# Patient Record
Sex: FEMALE | Race: WHITE | NOT HISPANIC OR LATINO | Employment: FULL TIME | URBAN - METROPOLITAN AREA
[De-identification: names, ages, dates, MRNs, and addresses within clinical notes are randomized per-mention and may not be internally consistent; named-entity substitution may affect disease eponyms.]

---

## 2018-01-01 ENCOUNTER — TELEPHONE (OUTPATIENT)
Dept: INFECTIOUS DISEASES | Facility: CLINIC | Age: 43
End: 2018-01-01

## 2018-01-01 ENCOUNTER — OFFICE VISIT (OUTPATIENT)
Dept: INFECTIOUS DISEASES | Facility: CLINIC | Age: 43
End: 2018-01-01
Payer: MEDICAID

## 2018-01-01 ENCOUNTER — LAB VISIT (OUTPATIENT)
Dept: LAB | Facility: HOSPITAL | Age: 43
End: 2018-01-01
Attending: INTERNAL MEDICINE
Payer: MEDICAID

## 2018-01-01 ENCOUNTER — HOSPITAL ENCOUNTER (EMERGENCY)
Facility: HOSPITAL | Age: 43
End: 2018-07-05
Attending: EMERGENCY MEDICINE
Payer: MEDICAID

## 2018-01-01 VITALS — BODY MASS INDEX: 29.03 KG/M2 | HEIGHT: 67 IN | WEIGHT: 185 LBS

## 2018-01-01 VITALS — BODY MASS INDEX: 29.1 KG/M2 | WEIGHT: 185.44 LBS | HEIGHT: 67 IN

## 2018-01-01 VITALS — DIASTOLIC BLOOD PRESSURE: 77 MMHG | SYSTOLIC BLOOD PRESSURE: 129 MMHG

## 2018-01-01 DIAGNOSIS — R76.8 HEPATITIS B CORE ANTIBODY POSITIVE: ICD-10-CM

## 2018-01-01 DIAGNOSIS — Z15.89: ICD-10-CM

## 2018-01-01 DIAGNOSIS — B20 HUMAN IMMUNODEFICIENCY VIRUS (HIV) DISEASE: ICD-10-CM

## 2018-01-01 DIAGNOSIS — B20 HUMAN IMMUNODEFICIENCY VIRUS (HIV) DISEASE: Primary | ICD-10-CM

## 2018-01-01 DIAGNOSIS — I46.8 TRAUMATIC CARDIAC ARREST: Primary | ICD-10-CM

## 2018-01-01 DIAGNOSIS — F31.9 BIPOLAR AFFECTIVE DISORDER, REMISSION STATUS UNSPECIFIED: ICD-10-CM

## 2018-01-01 LAB
ALBUMIN SERPL BCP-MCNC: 3.8 G/DL
ALP SERPL-CCNC: 62 U/L
ALT SERPL W/O P-5'-P-CCNC: 16 U/L
ANION GAP SERPL CALC-SCNC: 7 MMOL/L
AST SERPL-CCNC: 17 U/L
BASOPHILS # BLD AUTO: 0.03 K/UL
BASOPHILS NFR BLD: 0.4 %
BILIRUB SERPL-MCNC: 1.1 MG/DL
BUN SERPL-MCNC: 10 MG/DL
CALCIUM SERPL-MCNC: 9.2 MG/DL
CD3+CD4+ CELLS # BLD: 614 CELLS/UL (ref 300–1400)
CD3+CD4+ CELLS NFR BLD: 38.2 % (ref 28–57)
CHLORIDE SERPL-SCNC: 106 MMOL/L
CHOLEST SERPL-MCNC: 127 MG/DL
CHOLEST/HDLC SERPL: 2.2 {RATIO}
CMV IGG SERPL QL IA: NORMAL
CO2 SERPL-SCNC: 28 MMOL/L
CREAT SERPL-MCNC: 0.8 MG/DL
DIFFERENTIAL METHOD: ABNORMAL
EOSINOPHIL # BLD AUTO: 0.6 K/UL
EOSINOPHIL NFR BLD: 8.5 %
ERYTHROCYTE [DISTWIDTH] IN BLOOD BY AUTOMATED COUNT: 13.4 %
EST. GFR  (AFRICAN AMERICAN): >60 ML/MIN/1.73 M^2
EST. GFR  (NON AFRICAN AMERICAN): >60 ML/MIN/1.73 M^2
GLUCOSE SERPL-MCNC: 91 MG/DL
GLUCOSE-6-PHOSPHATE DEHYDROGENASE QUAL: NORMAL
HBV CORE AB SERPL QL IA: POSITIVE
HBV SURFACE AB SER-ACNC: POSITIVE M[IU]/ML
HBV SURFACE AG SERPL QL IA: NEGATIVE
HCT VFR BLD AUTO: 39.8 %
HCV AB SERPL QL IA: NEGATIVE
HDLC SERPL-MCNC: 57 MG/DL
HDLC SERPL: 44.9 %
HGB BLD-MCNC: 13 G/DL
IMM GRANULOCYTES # BLD AUTO: 0.02 K/UL
IMM GRANULOCYTES NFR BLD AUTO: 0.3 %
LDLC SERPL CALC-MCNC: 59 MG/DL
LYMPHOCYTES # BLD AUTO: 1.6 K/UL
LYMPHOCYTES NFR BLD: 21.2 %
MCH RBC QN AUTO: 32.7 PG
MCHC RBC AUTO-ENTMCNC: 32.7 G/DL
MCV RBC AUTO: 100 FL
MITOGEN NIL: >10 IU/ML
MONOCYTES # BLD AUTO: 0.7 K/UL
MONOCYTES NFR BLD: 9.7 %
NEUTROPHILS # BLD AUTO: 4.4 K/UL
NEUTROPHILS NFR BLD: 59.9 %
NIL: 0.03 IU/ML
NONHDLC SERPL-MCNC: 70 MG/DL
NRBC BLD-RTO: 0 /100 WBC
PLATELET # BLD AUTO: 245 K/UL
PMV BLD AUTO: 11.7 FL
POTASSIUM SERPL-SCNC: 3.7 MMOL/L
PROT SERPL-MCNC: 7 G/DL
RBC # BLD AUTO: 3.98 M/UL
RPR SER QL: NORMAL
SODIUM SERPL-SCNC: 141 MMOL/L
T GONDII IGG SER QL IA: NORMAL
T GONDII IGG SERPL IA-ACNC: <5 IU/ML
T PALLIDUM AB SER QL IF: NORMAL
TB ANTIGEN NIL: 0.01 IU/ML
TB ANTIGEN: 0.04 IU/ML
TB GOLD: NEGATIVE
TRIGL SERPL-MCNC: 55 MG/DL
WBC # BLD AUTO: 7.41 K/UL

## 2018-01-01 PROCEDURE — 86592 SYPHILIS TEST NON-TREP QUAL: CPT

## 2018-01-01 PROCEDURE — 99202 OFFICE O/P NEW SF 15 MIN: CPT | Mod: PBBFAC,PO | Performed by: INTERNAL MEDICINE

## 2018-01-01 PROCEDURE — 99212 OFFICE O/P EST SF 10 MIN: CPT | Mod: PBBFAC,PO | Performed by: INTERNAL MEDICINE

## 2018-01-01 PROCEDURE — 31500 INSERT EMERGENCY AIRWAY: CPT

## 2018-01-01 PROCEDURE — 82960 TEST FOR G6PD ENZYME: CPT

## 2018-01-01 PROCEDURE — 86803 HEPATITIS C AB TEST: CPT

## 2018-01-01 PROCEDURE — 86777 TOXOPLASMA ANTIBODY: CPT

## 2018-01-01 PROCEDURE — 99204 OFFICE O/P NEW MOD 45 MIN: CPT | Mod: S$PBB,,, | Performed by: INTERNAL MEDICINE

## 2018-01-01 PROCEDURE — 99999 PR PBB SHADOW E&M-NEW PATIENT-LVL II: CPT | Mod: PBBFAC,,, | Performed by: INTERNAL MEDICINE

## 2018-01-01 PROCEDURE — 86480 TB TEST CELL IMMUN MEASURE: CPT

## 2018-01-01 PROCEDURE — 96374 THER/PROPH/DIAG INJ IV PUSH: CPT

## 2018-01-01 PROCEDURE — 86704 HEP B CORE ANTIBODY TOTAL: CPT

## 2018-01-01 PROCEDURE — 87340 HEPATITIS B SURFACE AG IA: CPT

## 2018-01-01 PROCEDURE — 86644 CMV ANTIBODY: CPT

## 2018-01-01 PROCEDURE — 36415 COLL VENOUS BLD VENIPUNCTURE: CPT | Mod: PO

## 2018-01-01 PROCEDURE — 85025 COMPLETE CBC W/AUTO DIFF WBC: CPT

## 2018-01-01 PROCEDURE — 32554 ASPIRATE PLEURA W/O IMAGING: CPT

## 2018-01-01 PROCEDURE — 86780 TREPONEMA PALLIDUM: CPT

## 2018-01-01 PROCEDURE — 80061 LIPID PANEL: CPT

## 2018-01-01 PROCEDURE — 63600175 PHARM REV CODE 636 W HCPCS: Performed by: EMERGENCY MEDICINE

## 2018-01-01 PROCEDURE — 99999 PR PBB SHADOW E&M-EST. PATIENT-LVL II: CPT | Mod: PBBFAC,,, | Performed by: INTERNAL MEDICINE

## 2018-01-01 PROCEDURE — 86706 HEP B SURFACE ANTIBODY: CPT

## 2018-01-01 PROCEDURE — 99291 CRITICAL CARE FIRST HOUR: CPT | Mod: 25

## 2018-01-01 PROCEDURE — 99214 OFFICE O/P EST MOD 30 MIN: CPT | Mod: S$PBB,,, | Performed by: INTERNAL MEDICINE

## 2018-01-01 PROCEDURE — 86361 T CELL ABSOLUTE COUNT: CPT

## 2018-01-01 PROCEDURE — 80053 COMPREHEN METABOLIC PANEL: CPT

## 2018-01-01 RX ORDER — EPINEPHRINE 0.1 MG/ML
INJECTION INTRAVENOUS CODE/TRAUMA/SEDATION MEDICATION
Status: COMPLETED | OUTPATIENT
Start: 2018-01-01 | End: 2018-01-01

## 2018-01-01 RX ADMIN — EPINEPHRINE 1 MG: 0.1 INJECTION, SOLUTION ENDOTRACHEAL; INTRACARDIAC; INTRAVENOUS at 10:07

## 2018-05-22 NOTE — PROGRESS NOTES
Subjective:      Patient ID: Samantha Napier is a 43 y.o. female.    Chief Complaint:HIV Positive/AIDS      History of Present Illness    A 43-year-old woman with bipolar disorder, and HIV a diagnosed in 5 years ago during routing screening as she was a blood/plasma donor. She was followed at [facility] in [place] at the time of her diagnosis however she states she was told she had no indications for antiretroviral therapy. She does not know her viral load or her CD 4 counts. She has not been diagnosed with AIDS defining illness. She acquired HIV thru a heterosexual sex contact.She is seen in clinic today to establish HIV care.     Mrs. Napier recently moved back to [place] from [place] and [facility]. She has no known drug allergies. She smokes 2 cigarettes per day and has smoked since age 14. She consumes 2-3 beers per day every day. She does not currently use illicit drugs however has smoked marijuana in the past. She is sexually active with male and female partners. She estimates 20 sex partners in the past year and estimates over 200 sex partners in her lifetime. She uses condoms inconsistently. She does not fish, hunt or garden. She recently started working as a  at [employer].      Review of Systems   Constitution: Negative for chills, decreased appetite, fever, weakness, malaise/fatigue, night sweats, weight gain and weight loss.   HENT: Negative for congestion, ear pain, hearing loss, hoarse voice, sore throat and tinnitus.    Eyes: Negative for blurred vision, redness and visual disturbance.   Cardiovascular: Negative for chest pain, leg swelling and palpitations.   Respiratory: Negative for cough, hemoptysis, shortness of breath and sputum production.    Hematologic/Lymphatic: Negative for adenopathy. Does not bruise/bleed easily.   Skin: Negative for dry skin, itching, rash and suspicious lesions.   Musculoskeletal: Negative for back pain, joint pain, myalgias and neck pain.    Gastrointestinal: Negative for abdominal pain, constipation, diarrhea, heartburn, nausea and vomiting.   Genitourinary: Negative for dysuria, flank pain, frequency, hematuria, hesitancy and urgency.   Neurological: Negative for dizziness, headaches, numbness and paresthesias.   Psychiatric/Behavioral: Negative for depression and memory loss. The patient does not have insomnia and is not nervous/anxious.      Objective:   Physical Exam   Constitutional: She is oriented to person, place, and time. She appears well-developed and well-nourished. She is cooperative. She is easily aroused.  Non-toxic appearance. No distress.   HENT:   Head: Normocephalic and atraumatic. Head is without right periorbital erythema and without left periorbital erythema.   Right Ear: Hearing, tympanic membrane, external ear and ear canal normal. No swelling.   Left Ear: Hearing, tympanic membrane, external ear and ear canal normal. No swelling.   Nose: Nose normal. No nasal deformity.   Mouth/Throat: Uvula is midline, oropharynx is clear and moist and mucous membranes are normal. No oropharyngeal exudate.   Eyes: Conjunctivae, EOM and lids are normal. Pupils are equal, round, and reactive to light. Right eye exhibits no discharge and no exudate. Left eye exhibits no discharge and no exudate. Right conjunctiva is not injected. Left conjunctiva is not injected. No scleral icterus.   Neck: Normal range of motion. Neck supple. No tracheal deviation present. No thyromegaly present.   Cardiovascular: Normal rate, regular rhythm, S1 normal, S2 normal, normal heart sounds and intact distal pulses.  Exam reveals no gallop and no friction rub.    No murmur heard.  Pulmonary/Chest: Effort normal and breath sounds normal. No accessory muscle usage or stridor. No tachypnea. No respiratory distress. She has no wheezes. She has no rales. She exhibits no tenderness.   Abdominal: Soft. Normal appearance and bowel sounds are normal. She exhibits no  distension. There is no tenderness. There is no rebound and no guarding.   Musculoskeletal: Normal range of motion. She exhibits no edema or tenderness.   Neurological: She is alert, oriented to person, place, and time and easily aroused. No cranial nerve deficit. Coordination normal.   Skin: Skin is warm and dry. No lesion and no rash noted. She is not diaphoretic. No cyanosis or erythema. No pallor. Nails show no clubbing.   Tattoo bilateral ankles   Psychiatric: Judgment and thought content normal. Her mood appears anxious. Her speech is rapid and/or pressured. She is hyperactive.   Nursing note and vitals reviewed.    Assessment:       1. Human immunodeficiency virus (HIV) disease    2. Bipolar affective disorder, remission status unspecified          Plan:   HIV  No recent work up. Lost to follow up and has not established care in 4 years post her diagnosis.   · HIV VL, CD4, CBC, CMP, Quantiferon Gold Tb, lipid panel, RPR, FTA-ABS, CMV IgG, Toxoplasma IgG, HCV ab, HBV Ag and Ab, G6PD, and HLA-.   · Will not start empiric ART for treatment naive HIV until above results are available to vicente optimal time to start.  · Unclear if patient will be adherent to therapy at this time.   · I explained HIV disease, and management including but not limited to disease course, goals of treatment, and complications.   Bipolar disorder type 1  Patient appears to be manic.  · Management per primary care or psychiatry.  Follow up  · RTC on 6/11 at 1 pm to discuss above work up and management plan.

## 2018-05-22 NOTE — LETTER
May 22, 2018      Pamela Plaza, [facility]  [address] [place] [address]           [facility]  [address] [facility] [address]  [facility] [address]  Phone: [phone]  Fax: [phone]          Patient: Samantha Napier   MR Number: [id number]   YOB: 1975   Date of Visit: 5/22/2018       Dear Pamela Plaza:    Thank you for referring Samantha Napier to me for evaluation. Attached you will find relevant portions of my assessment and plan of care.    If you have questions, please do not hesitate to call me. I look forward to following Samantha Napier along with you.    Sincerely,    Roro Serrato MD    Enclosure  CC:  No Recipients    If you would like to receive this communication electronically, please contact [email or web address] or [phone] to request more information on [facility] access.    For providers and/or their staff who would like to refer a patient to [facility], please contact us through our one-stop-shop provider referral line, [facility], at [phone].    If you feel you have received this communication in error or would no longer like to receive these types of communications, please e-mail [email or web address]

## 2018-06-11 NOTE — PROGRESS NOTES
Subjective:      Patient ID: Samantha Napier is a 43 y.o. female.    Chief Complaint:Follow-up (HIV)      History of Present Illness  HPI     Follow-up    Additional comments: HIV       Last edited by Rabia Napier LPN on 6/11/2018  1:15 PM. (History)      A 43-year-old woman with bipolar disorder, and HIV who is seen today for continued management of her HIV disease. She feels well and has no complaints. She will be visiting her daughter out of state for several months.     Mrs. Napier was initially diagnosed diagnosed with HIV 5 years ago during routing screening as she was a blood/plasma donor. She was followed at [facility] in [place] at the time of her diagnosis however she states she was told she had no indications for antiretroviral therapy. She does not know her viral load or her CD 4 counts. She has not been diagnosed with AIDS defining illness. She acquired HIV thru a heterosexual sex contact.She is seen in clinic today to establish HIV care.      Mrs. Napier recently moved back to [place] from [place] and [facility]. She has no known drug allergies. She smokes 2 cigarettes per day and has smoked since age 14. She consumes 2-3 beers per day every day. She does not currently use illicit drugs however has smoked marijuana in the past. She is sexually active with male and female partners. She estimates 20 sex partners in the past year and estimates over 200 sex partners in her lifetime. She uses condoms inconsistently. She does not fish, hunt or garden. She recently started working as a  at [employer].      Review of Systems   Constitution: Negative for chills, decreased appetite, fever, weakness, malaise/fatigue, night sweats, weight gain and weight loss.   HENT: Negative for congestion, ear pain, hearing loss, hoarse voice, sore throat and tinnitus.    Eyes: Negative for blurred vision, redness and visual disturbance.   Cardiovascular: Negative for chest pain, leg swelling and  palpitations.   Respiratory: Negative for cough, hemoptysis, shortness of breath and sputum production.    Hematologic/Lymphatic: Negative for adenopathy. Does not bruise/bleed easily.   Skin: Negative for dry skin, itching, rash and suspicious lesions.   Musculoskeletal: Negative for back pain, joint pain, myalgias and neck pain.   Gastrointestinal: Negative for abdominal pain, constipation, diarrhea, heartburn, nausea and vomiting.   Genitourinary: Negative for dysuria, flank pain, frequency, hematuria, hesitancy and urgency.   Neurological: Negative for dizziness, headaches, numbness and paresthesias.   Psychiatric/Behavioral: Negative for depression and memory loss. The patient does not have insomnia and is not nervous/anxious.      Objective:   Physical Exam   Constitutional: She is oriented to person, place, and time. She appears well-developed and well-nourished. She is cooperative. She is easily aroused.  Non-toxic appearance. No distress.   HENT:   Head: Normocephalic and atraumatic. Head is without right periorbital erythema and without left periorbital erythema.   Right Ear: Hearing, tympanic membrane, external ear and ear canal normal. No swelling.   Left Ear: Hearing, tympanic membrane, external ear and ear canal normal. No swelling.   Nose: Nose normal. No nasal deformity.   Mouth/Throat: Uvula is midline, oropharynx is clear and moist and mucous membranes are normal. No oropharyngeal exudate.   Eyes: Conjunctivae, EOM and lids are normal. Pupils are equal, round, and reactive to light. Right eye exhibits no discharge and no exudate. Left eye exhibits no discharge and no exudate. Right conjunctiva is not injected. Left conjunctiva is not injected. No scleral icterus.   Neck: Normal range of motion. Neck supple. No tracheal deviation present. No thyromegaly present.   Cardiovascular: Normal rate, regular rhythm, S1 normal, S2 normal, normal heart sounds and intact distal pulses.  Exam reveals no gallop  and no friction rub.    No murmur heard.  Pulmonary/Chest: Effort normal and breath sounds normal. No accessory muscle usage or stridor. No tachypnea. No respiratory distress. She has no wheezes. She has no rales. She exhibits no tenderness.   Abdominal: Soft. Normal appearance and bowel sounds are normal. She exhibits no distension. There is no tenderness. There is no rebound and no guarding.   Musculoskeletal: Normal range of motion. She exhibits no edema or tenderness.   Neurological: She is alert, oriented to person, place, and time and easily aroused. No cranial nerve deficit. Coordination normal.   Skin: Skin is warm and dry. No lesion and no rash noted. She is not diaphoretic. No cyanosis or erythema. No pallor. Nails show no clubbing.   Tattoo bilateral ankles   Psychiatric: Judgment and thought content normal. Her mood appears anxious. Her speech is rapid and/or pressured. She is hyperactive.   Nursing note and vitals reviewed.    Laboratory:     Component      Latest Ref Rng & Units 6/6/2018   WBC      3.90 - 12.70 K/uL    RBC      4.00 - 5.40 M/uL    Hemoglobin      12.0 - 16.0 g/dL    Hematocrit      37.0 - 48.5 %    MCV      82 - 98 fL    MCH      27.0 - 31.0 pg    MCHC      32.0 - 36.0 g/dL    RDW      11.5 - 14.5 %    Platelets      150 - 350 K/uL    MPV      9.2 - 12.9 fL    Immature Granulocytes      0.0 - 0.5 %    Gran # (ANC)      1.8 - 7.7 K/uL    Immature Grans (Abs)      0.00 - 0.04 K/uL    Lymph #      1.0 - 4.8 K/uL    Mono #      0.3 - 1.0 K/uL    Eos #      0.0 - 0.5 K/uL    Baso #      0.00 - 0.20 K/uL    nRBC      0 /100 WBC    Gran%      38.0 - 73.0 %    Lymph%      18.0 - 48.0 %    Mono%      4.0 - 15.0 %    Eosinophil%      0.0 - 8.0 %    Basophil%      0.0 - 1.9 %    Differential Method          Sodium      136 - 145 mmol/L    Potassium      3.5 - 5.1 mmol/L    Chloride      95 - 110 mmol/L    CO2      23 - 29 mmol/L    Glucose      70 - 110 mg/dL    BUN, Bld      6 - 20 mg/dL     Creatinine      0.5 - 1.4 mg/dL    Calcium      8.7 - 10.5 mg/dL    Total Protein      6.0 - 8.4 g/dL    Albumin      3.5 - 5.2 g/dL    Total Bilirubin      0.1 - 1.0 mg/dL    Alkaline Phosphatase      55 - 135 U/L    AST      10 - 40 U/L    ALT      10 - 44 U/L    Anion Gap      8 - 16 mmol/L    eGFR if African American      >60 mL/min/1.73 m:2    eGFR if non African American      >60 mL/min/1.73 m:2    Cholesterol      120 - 199 mg/dL    Triglycerides      30 - 150 mg/dL    HDL      40 - 75 mg/dL    LDL Cholesterol      63.0 - 159.0 mg/dL    HDL/Chol Ratio      20.0 - 50.0 %    Total Cholesterol/HDL Ratio      2.0 - 5.0    Non-HDL Cholesterol      mg/dL    NIL      See text IU/mL 0.029   TB Antigen      See text IU/mL 0.043   TB Antigen - Nil      See Text IU/mL 0.014   Mitogen - Nil      See text IU/mL >10.000   TB Gold       Negative   HIV-1 RNA, Qual      Not detected Not detected   HIV 1 RNA Ultra      <40 Copies/mL <40   Log HIV Copies/mL      <1.60 Log (10) Copies/mL <1.60   HIV UQ Date Received       6/7/18   HIV UQ Date Reported       6/8/18    Interpretation           Testing Date           Result          CD4 % [place] T Cell      28 - 57 %    Absolute CD4      300 - 1400 cells/ul    Toxoplasma gondii IGG      0.0 - 6.4 IU/mL    Toxoplasma IGG Interpretation          RPR      Non-reactive    FTA-ABS      Non-reactive    CMV IgG Interpretation          Hep B Core Total Ab          Hep B S Ab          Hepatitis C Ab          Hepatitis B Surface Ag          G6PD, Qual      Normal Activity      Component      Latest Ref Rng & Units 5/24/2018   WBC      3.90 - 12.70 K/uL 7.41   RBC      4.00 - 5.40 M/uL 3.98 (L)   Hemoglobin      12.0 - 16.0 g/dL 13.0   Hematocrit      37.0 - 48.5 % 39.8   MCV      82 - 98 fL 100 (H)   MCH      27.0 - 31.0 pg 32.7 (H)   MCHC      32.0 - 36.0 g/dL 32.7   RDW      11.5 - 14.5 % 13.4   Platelets      150 - 350 K/uL 245   MPV      9.2 - 12.9 fL 11.7   Immature  Granulocytes      0.0 - 0.5 % 0.3   Gran # (ANC)      1.8 - 7.7 K/uL 4.4   Immature Grans (Abs)      0.00 - 0.04 K/uL 0.02   Lymph #      1.0 - 4.8 K/uL 1.6   Mono #      0.3 - 1.0 K/uL 0.7   Eos #      0.0 - 0.5 K/uL 0.6 (H)   Baso #      0.00 - 0.20 K/uL 0.03   nRBC      0 /100 WBC 0   Gran%      38.0 - 73.0 % 59.9   Lymph%      18.0 - 48.0 % 21.2   Mono%      4.0 - 15.0 % 9.7   Eosinophil%      0.0 - 8.0 % 8.5 (H)   Basophil%      0.0 - 1.9 % 0.4   Differential Method       Automated   Sodium      136 - 145 mmol/L 141   Potassium      3.5 - 5.1 mmol/L 3.7   Chloride      95 - 110 mmol/L 106   CO2      23 - 29 mmol/L 28   Glucose      70 - 110 mg/dL 91   BUN, Bld      6 - 20 mg/dL 10   Creatinine      0.5 - 1.4 mg/dL 0.8   Calcium      8.7 - 10.5 mg/dL 9.2   Total Protein      6.0 - 8.4 g/dL 7.0   Albumin      3.5 - 5.2 g/dL 3.8   Total Bilirubin      0.1 - 1.0 mg/dL 1.1 (H)   Alkaline Phosphatase      55 - 135 U/L 62   AST      10 - 40 U/L 17   ALT      10 - 44 U/L 16   Anion Gap      8 - 16 mmol/L 7 (L)   eGFR if African American      >60 mL/min/1.73 m:2 >60.0   eGFR if non African American      >60 mL/min/1.73 m:2 >60.0   Cholesterol      120 - 199 mg/dL 127   Triglycerides      30 - 150 mg/dL 55   HDL      40 - 75 mg/dL 57   LDL Cholesterol      63.0 - 159.0 mg/dL 59.0 (L)   HDL/Chol Ratio      20.0 - 50.0 % 44.9   Total Cholesterol/HDL Ratio      2.0 - 5.0 2.2   Non-HDL Cholesterol      mg/dL 70   NIL      See text IU/mL    TB Antigen      See text IU/mL    TB Antigen - Nil      See Text IU/mL    Mitogen - Nil      See text IU/mL    TB Gold          HIV-1 RNA, Qual      Not detected    HIV 1 RNA Ultra      <40 Copies/mL    Log HIV Copies/mL      <1.60 Log (10) Copies/mL    HIV UQ Date Received          HIV UQ Date Reported           Interpretation       Samantha Napier ([id number]) is positive for B*57:01    Testing Date       05/28/2018 12:00 AM    Result       Positive   CD4 % [place] T Cell       28 - 57 % 38.2   Absolute CD4      300 - 1400 cells/ul 614   Toxoplasma gondii IGG      0.0 - 6.4 IU/mL <5.0   Toxoplasma IGG Interpretation       Non-reactive   RPR      Non-reactive Non-reactive   FTA-ABS      Non-reactive Non-reactive   CMV IgG Interpretation       Non-Reactive   Hep B Core Total Ab       Positive (A)   Hep B S Ab       Positive (A)   Hepatitis C Ab       Negative   Hepatitis B Surface Ag       Negative   G6PD, Qual      Normal Activity Normal Activity     Assessment:       1. Human immunodeficiency virus (HIV) disease    2. Hepatitis B core antibody positive    3. Susceptibility to abacavir hypersensitivity    4. Bipolar affective disorder, remission status unspecified        Plan:   HIV  Results above reviewed and discussed with the patient. She has HIV diseases with an undetectable viral load. I suspect she is a long term non progressor. She has not had ART exposure.   · No indication for antiretroviral therapy or opportunistic infection prophylaxis at this time.   · Patient counseled to continue safe sex practices and to disclose her HIV status with sex partners.   Hepatitis B core Ab positive  Patient is immune to HBV as evidenced by her hep B surface antibody positivity. However she also has a positive hep B core antibody which is positive. If she were to be immunosuppressed then she will require close monitoring for HBV reactivation.   Susceptibility to abacavir hypersensitivity  Patient with positive HLA . She is susceptible to have a hypersensitivity reaction to abacavir if ever exposed.   · Will add abacavir to allergy section.   Bipolar disorder type 1  Patient appears to be manic.  · Management per primary care or psychiatry.  Follow up  · RTC in 6 months.

## 2018-06-14 NOTE — TELEPHONE ENCOUNTER
----- Message from Lazara Wilson sent at 6/14/2018  8:13 AM CDT -----  Contact: [facility] is requesting faxed over the clinical notes for 6/11 on this pt,please.Fax#[phone],phone#[phone] direct line.
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
